# Patient Record
Sex: FEMALE | URBAN - METROPOLITAN AREA
[De-identification: names, ages, dates, MRNs, and addresses within clinical notes are randomized per-mention and may not be internally consistent; named-entity substitution may affect disease eponyms.]

---

## 2018-08-08 ENCOUNTER — APPOINTMENT (RX ONLY)
Dept: URBAN - METROPOLITAN AREA CLINIC 23 | Facility: CLINIC | Age: 73
Setting detail: DERMATOLOGY
End: 2018-08-08

## 2018-08-08 DIAGNOSIS — Z41.9 ENCOUNTER FOR PROCEDURE FOR PURPOSES OTHER THAN REMEDYING HEALTH STATE, UNSPECIFIED: ICD-10-CM

## 2018-08-08 PROCEDURE — ? FILLERS

## 2018-08-08 NOTE — PROCEDURE: FILLERS
Additional Area 1 Location: lateral cheeks, marionette lines, lateral mouth, NLF's
Consent: Written consent obtained. Risks include but not limited to bruising, beading, irregular texture, ulceration, infection, allergic reaction, scar formation, incomplete augmentation, temporary nature, procedural pain.
Additional Area 1 Volume In Cc: 0
Anesthesia Volume In Cc: 0.2
Topical Anesthesia?: 3% lidocaine, 6% prilocaine
Additional Area 2 Location: cheeks, lateral jawlines, lateral mouth
Post-Care Instructions: Patient instructed to apply ice to reduce swelling.
Anesthesia Type: 1% lidocaine without epinephrine
Filler: Luzma Engel
Lot #: [id number] [facility]
Additional Area 4 Location: fine line cheeks diluted with 0.2 lido
Expiration Date (Month Year): 02/2020
Expiration Date (Month Year): 01/2021
Additional Area 1 Volume In Cc: 1
Additional Area 3 Location: lateral eyes, nasal root
Additional Area 5 Location: cheeks and jawline,tear trough (used an acannula)
Additional Anesthesia Volume In Cc: 6
Expiration Date (Month Year): 01/31/2021
Price (Use Numbers Only, No Special Characters Or $): 0.00
Lot #: [id number]
Use Map Statement For Sites (Optional): No
Additional Area 1 Location: lateral jawline, lateral cheeks,
Lot #: [id number]
Map Statment: See [address] for Complete Details
Additional Area 2 Location: temples using an acannula
Detail Level: Zone

## 2019-07-30 ENCOUNTER — APPOINTMENT (RX ONLY)
Dept: URBAN - METROPOLITAN AREA CLINIC 23 | Facility: CLINIC | Age: 74
Setting detail: DERMATOLOGY
End: 2019-07-30

## 2019-07-30 DIAGNOSIS — Z41.9 ENCOUNTER FOR PROCEDURE FOR PURPOSES OTHER THAN REMEDYING HEALTH STATE, UNSPECIFIED: ICD-10-CM

## 2019-07-30 PROCEDURE — ? FILLERS

## 2019-07-30 NOTE — PROCEDURE: FILLERS
Expiration Date (Month Year): 09/2021
Tear Troughs Filler  Volume In Cc: 0
Expiration Date (Month Year): 2021-10-31
Expiration Date (Month Year): 08/31/2019
Include Cannula Information In Note?: No
Additional Anesthesia Volume In Cc: 6
Include Cannula Brand?: DermaSculpt
Consent: Written consent obtained. Risks include but not limited to bruising, beading, irregular texture, ulceration, infection, allergic reaction, scar formation, incomplete augmentation, temporary nature, procedural pain.
Include Cannula Size?: 25G
Post-Care Instructions: Patient instructed to apply ice to reduce swelling.
Include Cannula Length?: 1.5 inch
Additional Area 1 Location: lateral mouth, perioral fine lines, vermillion lips, marionette lines
Additional Area 1 Location: cheeks
Filler: Luzma Engel
Additional Area 1 Location: lateral mouth, fine lines perioral, marionette lines, lateral cheeks, vermillion lips
Detail Level: Zone
Additional Area 2 Location: cheeks, jawline, oral commissure
Additional Area 2 Location: Nasalabial, Glabellar fine lines- Complimentary
Additional Area 2 Location: Lips, oral commissure, glabellar fine fines
Price (Use Numbers Only, No Special Characters Or $): 0.00
Additional Area 3 Location: Glabbellar fine lines, Nasalabial folds, Marionette lines, vermillion lip
Map Statment: See [address] for Complete Details
Additional Area 4 Location: Perioral
Additional Area 4 Location: lateral cheeks, lateral jawline, medial cheeks, lateral mouth
Additional Area 4 Volume In Cc: 1
Additional Area 5 Location: Cheeks, vermillion lips, marionette fine lines, glabellar fine lines, lateral mouth
Anesthesia Type: 1% lidocaine without epinephrine
Lot #: [id number]
Lot #: [id number]
Lot #: [id number]

## 2022-04-12 ENCOUNTER — APPOINTMENT (RX ONLY)
Dept: URBAN - METROPOLITAN AREA CLINIC 23 | Facility: CLINIC | Age: 77
Setting detail: DERMATOLOGY
End: 2022-04-12

## 2022-04-12 DIAGNOSIS — Z41.9 ENCOUNTER FOR PROCEDURE FOR PURPOSES OTHER THAN REMEDYING HEALTH STATE, UNSPECIFIED: ICD-10-CM

## 2022-04-12 DIAGNOSIS — L30.4 ERYTHEMA INTERTRIGO: ICD-10-CM

## 2022-04-12 PROCEDURE — ? FILLERS

## 2022-04-12 PROCEDURE — ? COUNSELING

## 2022-04-12 PROCEDURE — ? DYSPORT

## 2022-04-12 PROCEDURE — 99213 OFFICE O/P EST LOW 20 MIN: CPT

## 2022-04-12 PROCEDURE — ? PRESCRIPTION

## 2022-04-12 RX ORDER — ECONAZOLE NITRATE 10 MG/G
CREAM TOPICAL BID
Qty: 30 | Refills: 3 | Status: ERX | COMMUNITY
Start: 2022-04-12

## 2022-04-12 RX ADMIN — ECONAZOLE NITRATE: 10 CREAM TOPICAL at 00:00

## 2022-04-12 ASSESSMENT — BSA RASH: BSA RASH: 4

## 2022-04-12 NOTE — PROCEDURE: DYSPORT
Detail Level: Simple
[facility] Units: 0
Show Right And Left Pupillary Line Units: No
Show Anterior Platysmal Band Units: Yes
Glabellar Complex Units: [address]
Additional Area 1 Units: 10
Expiration Date (Month Year): 2022-12
Additional Area 4 Location: 2cm high forehead
Additional Area 2 Location: crows feet
Price (Use Numbers Only, No Special Characters Or $): 0.00
Show Inventory Tab: Hide
Lot #: [id number]
Additional Area 5 Location: glabella
Additional Area 3 Location: high forehead 3 cm above brows
Dilution (U/ 0.1cc): 1.5
Consent: Written consent obtained. Risks include but not limited to lid/brow ptosis, bruising, swelling, diplopia, temporary effect, incomplete chemical denervation.
Additional Area 1 Location: lat brows

## 2022-04-12 NOTE — PROCEDURE: FILLERS
Decollete Filler  Volume In Cc: 0
Consent: Written consent obtained. Risks include but not limited to bruising, beading, irregular texture, ulceration, infection, allergic reaction, scar formation, incomplete augmentation, temporary nature, procedural pain.
Include Cannula Information In Note?: No
Post-Care Instructions: Patient instructed to apply ice to reduce swelling.
Additional Anesthesia Volume In Cc: 6
Additional Area 1 Location: lateral mouth, fine lines perioral, marionette lines, lateral cheeks, vermillion lips
Include Cannula Size?: 25G
Additional Area 1 Location: vermilion lips and oral commesure
Expiration Date (Month Year): 2024-03
Include Cannula Length?: 1.5 inch
Additional Area 2 Location: Nasalabial, Glabellar fine lines- Complimentary
Additional Area 2 Location: jawline, oral commissure
Lot #: [id number]
Additional Area 2 Volume In Cc: 1
Filler: Luzma Engel
Detail Level: Zone
Additional Area 4 Location: Perioral
Additional Area 4 Location: lateral jawline, lateral mouth, glabella lines,
Price (Use Numbers Only, No Special Characters Or $): 0.00
Additional Area 5 Location: Cheeks, vermillion lips, marionette fine lines, glabellar fine lines, lateral mouth
Additional Area 3 Location: Glabbellar fine lines, Nasalabial folds, Marionette lines, vermillion lip
Lot #: [id number]
Map Statment: See [address] for Complete Details
Additional Area 2 Location: Lips, oral commissure, glabellar fine fines
Lot #: [id number]
Expiration Date (Month Year): 08/31/2019
Additional Area 1 Location: lateral mouth, perioral fine lines, vermillion lips, marionette lines
Anesthesia Type: 1% lidocaine without epinephrine
Expiration Date (Month Year): 2024-08
Include Cannula Brand?: DermaSculpt

## 2024-06-04 ENCOUNTER — APPOINTMENT (RX ONLY)
Dept: URBAN - METROPOLITAN AREA CLINIC 23 | Facility: CLINIC | Age: 79
Setting detail: DERMATOLOGY
End: 2024-06-04

## 2024-06-04 DIAGNOSIS — D22 MELANOCYTIC NEVI: ICD-10-CM

## 2024-06-04 DIAGNOSIS — L85.3 XEROSIS CUTIS: ICD-10-CM

## 2024-06-04 DIAGNOSIS — L21.8 OTHER SEBORRHEIC DERMATITIS: ICD-10-CM

## 2024-06-04 DIAGNOSIS — L82.1 OTHER SEBORRHEIC KERATOSIS: ICD-10-CM

## 2024-06-04 DIAGNOSIS — R20.2 PARESTHESIA OF SKIN: ICD-10-CM

## 2024-06-04 PROBLEM — D22.5 MELANOCYTIC NEVI OF TRUNK: Status: ACTIVE | Noted: 2024-06-04

## 2024-06-04 PROCEDURE — 99213 OFFICE O/P EST LOW 20 MIN: CPT

## 2024-06-04 PROCEDURE — ? RECOMMENDATIONS

## 2024-06-04 PROCEDURE — ? SUNSCREEN RECOMMENDATIONS

## 2024-06-04 PROCEDURE — ? COUNSELING

## 2024-06-04 ASSESSMENT — LOCATION SIMPLE DESCRIPTION DERM
LOCATION SIMPLE: LEFT UPPER BACK
LOCATION SIMPLE: UPPER BACK
LOCATION SIMPLE: ABDOMEN

## 2024-06-04 ASSESSMENT — LOCATION DETAILED DESCRIPTION DERM
LOCATION DETAILED: LEFT MID-UPPER BACK
LOCATION DETAILED: EPIGASTRIC SKIN
LOCATION DETAILED: SUPERIOR THORACIC SPINE

## 2024-06-04 ASSESSMENT — LOCATION ZONE DERM: LOCATION ZONE: TRUNK

## 2024-06-04 NOTE — PROCEDURE: MIPS QUALITY
Name And Contact Information For Health Care Proxy: Dr. Martin Mendelssohn [phone]
Quality 47: Advance Care Plan: Advance Care Planning discussed and documented; advance care plan or surrogate decision maker documented in the medical record.
Detail Level: Detailed
Quality 226: Preventive Care And Screening: Tobacco Use: Screening And Cessation Intervention: Patient screened for tobacco use and is an ex/non-smoker

## 2024-06-04 NOTE — PROCEDURE: RECOMMENDATIONS
Detail Level: Detailed
Recommendations (Free Text): Moisturizer cream,presser, ice
Recommendation Preamble: The following recommendations were made during the visit:
Render Risk Assessment In Note?: no
Recommendations (Free Text): Moisturizer cream 5 mins after the shower
Recommendations (Free Text): Znp soap,cord aid around the Eyes

## 2024-12-03 ENCOUNTER — APPOINTMENT (OUTPATIENT)
Dept: URBAN - METROPOLITAN AREA CLINIC 23 | Facility: CLINIC | Age: 79
Setting detail: DERMATOLOGY
End: 2024-12-03

## 2024-12-03 DIAGNOSIS — Z41.9 ENCOUNTER FOR PROCEDURE FOR PURPOSES OTHER THAN REMEDYING HEALTH STATE, UNSPECIFIED: ICD-10-CM

## 2024-12-03 PROCEDURE — ? FILLERS

## 2024-12-03 NOTE — PROCEDURE: FILLERS
Lateral Face Filler Volume In Cc: 0
Additional Area 1 Volume In Cc: 1
Filler: Luzma Engel
Inventory Information: This plan will send filler information to inventory based on the fillers you select. Multiple fillers can be sent but you must ensure you select the appropriate fillers in the inventory tab.
Include Cannula Information In Note?: No
Additional Anesthesia Volume In Cc: 6
Detail Level: Detailed
Show Inventory Tab: Show
Additional Area 1 Location: lateral mouth, perioral fine lines, marionette lines.
Consent: Written consent obtained. Risks include but not limited to bruising, beading, irregular texture, ulceration, infection, allergic reaction, scar formation, incomplete augmentation, temporary nature, procedural pain.
Post-Care Instructions: Patient instructed to apply ice to reduce swelling.
Map Statment: See Attach Map for Complete Details
Anesthesia Type: 1% lidocaine with epinephrine
Additional Area 1 Location: oral commissures, nasolabial folds, marionette lines
Anesthesia Volume In Cc: 0.5